# Patient Record
Sex: MALE | Race: WHITE
[De-identification: names, ages, dates, MRNs, and addresses within clinical notes are randomized per-mention and may not be internally consistent; named-entity substitution may affect disease eponyms.]

---

## 2022-12-15 ENCOUNTER — HOSPITAL ENCOUNTER (OUTPATIENT)
Dept: HOSPITAL 46 - DS | Age: 67
Discharge: HOME | End: 2022-12-15
Attending: SURGERY
Payer: COMMERCIAL

## 2022-12-15 VITALS — HEIGHT: 64 IN | WEIGHT: 158.29 LBS | BODY MASS INDEX: 27.02 KG/M2

## 2022-12-15 DIAGNOSIS — K63.5: ICD-10-CM

## 2022-12-15 DIAGNOSIS — Z12.11: Primary | ICD-10-CM

## 2022-12-15 DIAGNOSIS — F10.90: ICD-10-CM

## 2022-12-15 DIAGNOSIS — K57.50: ICD-10-CM

## 2022-12-15 DIAGNOSIS — K64.0: ICD-10-CM

## 2022-12-15 DIAGNOSIS — Z80.0: ICD-10-CM

## 2022-12-15 DIAGNOSIS — D12.0: ICD-10-CM

## 2022-12-15 PROCEDURE — 0DBC8ZX EXCISION OF ILEOCECAL VALVE, VIA NATURAL OR ARTIFICIAL OPENING ENDOSCOPIC, DIAGNOSTIC: ICD-10-PCS | Performed by: SURGERY

## 2022-12-15 PROCEDURE — G0500 MOD SEDAT ENDO SERVICE >5YRS: HCPCS

## 2022-12-15 NOTE — NUR
PT ALERT, ORIENTED AND HAS HAD PREVIOUS SCOPES. PT SEEMS PREPARED, MENTIONED
HIS WIFE WILL BE HERE AT [place]. PT THANKED ME FOR VISIT, GAVE BLESSING

## 2022-12-15 NOTE — NUR
12/15/22 0806 Aparna Cedeño
0801-PATIENT ARRIVED TO PACU ON 2L NC RR EVEN LAYING LEFT LATERAL.
PATIENT NONAROUSABLE ABDOMEN SOFT. IVF INFUSING.

## 2022-12-16 NOTE — OR
[facility]
                                    [address], [place]  [address]
_________________________________________________________________________________________
                                                                 Signed   
 
 
DATE OF OPERATION:
12/15/2022
 
SURGEON:
Jessika Colón MD
 
PREOPERATIVE DIAGNOSES:
1. Moderate pandiverticulosis.
2. Internal hemorrhoids.
3. Hyperplastic and adenomatous polyps in 2015 at age 60.
4. Maternal cousin with colon cancer in his 50s.
 
POSTOPERATIVE DIAGNOSES:
1. A 3 mm polyp x2 at 6 cm.
2. A 4 mm polyp at 75 cm.
3. A 3 mm polyp at 50 cm.
4. Biopsy of the ileocecal valve.
5. Tattoo next to ileocecal valve.
6. Moderate pandiverticulosis.
 
PROCEDURES:
Colonoscopy with hot biopsy.
 
ESTIMATED BLOOD LOSS:
None.
 
INDICATIONS:
Cynthia is a 67-year-old gentleman asked to see me for followup colonoscopy.  I helped him
with a colonoscopy in  at the age of 60.  He had moderate pandiverticulosis along
with internal hemorrhoids.  He also hyperplastic and adenomatous polyps removed.  Next
to the ileocecal valve, we left a tattoo after we removed a 15 mm polyp.  In total, he
had seven polyps removed.  He had done well with Versed and fentanyl.  He came back to
us in .  We took out two additional adenomas polyps, one hyperplastic polyp.  These
polyps were less than 5 mm in diameter.  Once again, he had the pandiverticulosis along
with internal hemorrhoids.  He did well with Versed and fentanyl.  He said he is doing
fine since then.  He has no lower GI complaints.  He found that his maternal cousin was
diagnosed and  of colon cancer in his 50s.  Cynthia also said he quit drinking his
beer back in 2022.  He said he is retired and doing well.  He said he is
enjoying his grandkids.  In the office, I gave him a pamphlet on colonoscopy.  We had
reviewed the nature of the test.  There is risk including, but not limited to gas
bloating, crampy abdominal pain, bleeding, perforation requiring surgery, and missed
diagnosis.  He also recalls the need for IV conscious sedation.  He had expressed
 
    Electronically Signed By: JESSIKA COLÓN MD  22 0702
_________________________________________________________________________________________
PATIENT NAME:     CYNTHIA FLYNN                     
MEDICAL RECORD #: [id number]            OPERATIVE REPORT              
          ACCT #: [id number]  
DATE OF BIRTH:   [birth year]            REPORT #: [id number]      
PHYSICIAN:        JESSIKA COLÓN MD             
PCP:              LYN FRANCOIS MD             
REPORT IS CONFIDENTIAL AND NOT TO BE RELEASED WITHOUT AUTHORIZATION
 
 
                                  [facility]
                                    [address], [place]  [address]
_________________________________________________________________________________________
                                                                 Signed   
 
 
understanding and wished to proceed. 
 
DESCRIPTION OF PROCEDURE:
Cynthia was taken into our endoscopy suite and placed in the left lateral decubitus
position.  He was given a total of 9 mg of Versed and 200 mcg of fentanyl to cover the
case.  A digital rectal exam was performed and he had good sphincter tone.  There were a
little or no external hemorrhoids.  No masses noted.  Prostate is a little indurated.
The adult colonoscope had been introduced and advanced all around into the cecum under
direct visualization of camera without difficulty.  His prep was quite excellent.  We
could easily see the appendiceal orifice and the ileocecal valve.  We had taken pictures
throughout for photodocumentation.  He has a fairly prominent ileocecal valve, so we
just took a single biopsy of that ileocecal valve.  I suspect that will be fine.  We can
see the tattoo next to the ileocecal valve.  We examined the area carefully and it is
all well healed without any evidence of recurrent polyp.  As we withdrew the scope, he
indeed has moderate pandiverticulosis.  We also discovered several small polyps, which
were removed with the help of hot biopsy forceps.  Once down in the rectum, the scope
had been retroflexed and does have small internal hemorrhoid columns.  After this, the
gas was suctioned out.  The colonoscope removed.  Cynthia tolerated the procedure quite
well. 
 
RECOMMENDATIONS:
I will see Cynthia back in my office in 7 to 14 days to review his results.  I suspect he
will stay on the five year plan. 
 
 
 
            ________________________________________
            Jessika Colón MD 
 
 
ALB/MODL
Job #:  [id number]
DD:  12/15/2022 08:12:37
DT:  12/15/2022 14:50:55
 
cc:            [facility] 
 
               Jessika Colón MD 
 
               Patient Chart
 
 
 
    Electronically Signed By: JESSIKA COLÓN MD  22 0702
_________________________________________________________________________________________
PATIENT NAME:     CYNTHIA FLYNN                     
MEDICAL RECORD #: [id number]            OPERATIVE REPORT              
          ACCT #: [id number]  
DATE OF BIRTH:   [birth year]            REPORT #: [id number]      
PHYSICIAN:        JESSIKA COLÓN MD             
PCP:              LYN FRANCOIS MD             
REPORT IS CONFIDENTIAL AND NOT TO BE RELEASED WITHOUT AUTHORIZATION
 
 
                                  [facility]
                                    [address] [place] [address]  [address]
_________________________________________________________________________________________
                                                                 Signed   
 
 
Copies:  [facility]
         JESSIKA COLÓN MD
~
 
 
 
 
 
 
 
 
 
 
 
 
 
 
 
 
 
 
 
 
 
 
 
 
 
 
 
 
 
 
 
 
 
 
 
 
 
 
 
 
    Electronically Signed By: JESSIKA COLÓN MD  22 0702
_________________________________________________________________________________________
PATIENT NAME:     CYNTHIA FLYNN                     
MEDICAL RECORD #: [id number]            OPERATIVE REPORT              
          ACCT #: [id number]  
DATE OF BIRTH:   [birth year]            REPORT #: [id number]      
PHYSICIAN:        JESSIKA COLÓN MD             
PCP:              LYN FRANCOIS MD             
REPORT IS CONFIDENTIAL AND NOT TO BE RELEASED WITHOUT AUTHORIZATION

## 2022-12-19 NOTE — PATH
[facility]
                                    [address], [place]  [address]
_________________________________________________________________________________________
                                                                 Signed   
 
 
 
SPECIMEN(S): A POLYP AT 6 CM
SPECIMEN(S): B ILEOCECAL VALVE BIOPSY
SPECIMEN(S): C POLYP AT 75 CM
SPECIMEN(S): D POLYP AT 50 CM
 
SPECIMEN SOURCE:
A. POLYP AT 6 CM
B. ILEOCECAL VALVE BIOPSY
C. POLYP AT 75 CM
D. POLYP AT 50 CM
 
CLINICAL HISTORY:
Pre: 2017 adenomatous and hyperplastic polyps. Post: Diverticulosis, internal 
hemorrhoids, and polyps x3. 
 
FINAL PATHOLOGIC DIAGNOSIS:
A. Polyp at 6 cm:
-  Hyperplastic polyp (two fragments).
B. Ileocecal valve, biopsy:
-  Serrated polyp / adenoma (one fragment).
C. Polyp at 75 cm:
-  Hyperplastic polyp (one fragment).
D. Polyp at 50 cm:
-  Hyperplastic polyp (one fragment).
[facility]:C2NR
 
MICROSCOPIC EXAMINATION:
Histologic sections of all submitted blocks are examined by light microscopy.  
These findings, together with the gross examination, support the pathologic 
diagnosis. 
 
GROSS DESCRIPTION:
Four specimens are received in four containers, labeled "RB."
A.  The specimen, labeled "RB, polyp at 6 cm," is received in formalin and 
consists of two tan soft tissue fragments that measure 0.2-0.3 cm in greatest 
dimension. The specimen is entirely submitted 
in cassette (A1).
B.  The specimen, labeled "RB, ileocecal valve biopsy," is received in formalin 
and consists of one tan soft tissue fragment that measures 0.2 cm in greatest 
dimension. The specimen is entirely 
submitted in cassette (B1).
 
                                                                                    
_________________________________________________________________________________________
PATIENT NAME:     CYNTHIA FLYNN                     
MEDICAL RECORD #: [id number]            PATHOLOGY                     
          ACCT #: [id number]       ACCESSION #: [id number]     
DATE OF BIRTH:   [birth year]            REPORT #: [id number]       
PHYSICIAN:        NICKO LUBIN              
PCP:              LYN FRANCOIS MD             
REPORT IS CONFIDENTIAL AND NOT TO BE RELEASED WITHOUT AUTHORIZATION
 
 
                                  [facility]
                                    [address], [place]  [address]
_________________________________________________________________________________________
                                                                 Signed   
 
 
C.  The specimen, labeled "RB, polyp at 75 cm," is received in formalin and 
consists of one tan soft tissue fragments that measures 0.2 cm in greatest 
dimension. The specimen is entirely submitted in 
cassette (C1).
D.  The specimen, labeled "RB, polyp at 50 cm," is received in formalin and 
consists of one tan soft tissue fragment that measures 0.2 cm in greatest 
dimension. The specimen is entirely submitted in 
cassette (D1).
 
VB (under the direct supervision of a pathologist)
The Gross Description was prepared using a voice recognition system. The report 
was reviewed for accuracy; however, sound-alike word errors, addition and/or 
deletions may occur. If there is any 
question about this report, please contact Client Services.
 
PERFORMING LABORATORY:
The technical component was performed by [facility] [facility], [address] [place] [id number] (CLIA# [id number]).   Professional interpretation was 
performed by [facility], [address] [place] [id number] (CLIA#: [id number]).
 
Diagnostician:  Deniz Fenton MD
Pathologist
Electronically Signed 12/19/2022
 
 
Copies:                                
~
 
 
 
 
 
 
 
 
 
 
 
 
 
 
 
                                                                                    
_________________________________________________________________________________________
PATIENT NAME:     CYNTHIA FLYNN                     
MEDICAL RECORD #: [id number]            PATHOLOGY                     
          ACCT #: [id number]       ACCESSION #: [id number]     
DATE OF BIRTH:   [birth year]            REPORT #: [id number]       
PHYSICIAN:        [facility] PATHOLOGY              
PCP:              LYN FRANCOIS MD             
REPORT IS CONFIDENTIAL AND NOT TO BE RELEASED WITHOUT AUTHORIZATION